# Patient Record
Sex: FEMALE | Race: BLACK OR AFRICAN AMERICAN | NOT HISPANIC OR LATINO | URBAN - METROPOLITAN AREA
[De-identification: names, ages, dates, MRNs, and addresses within clinical notes are randomized per-mention and may not be internally consistent; named-entity substitution may affect disease eponyms.]

---

## 2024-09-20 ENCOUNTER — EMERGENCY (EMERGENCY)
Facility: HOSPITAL | Age: 29
LOS: 1 days | End: 2024-09-20
Attending: EMERGENCY MEDICINE
Payer: MEDICAID

## 2024-09-20 VITALS
RESPIRATION RATE: 18 BRPM | OXYGEN SATURATION: 100 % | SYSTOLIC BLOOD PRESSURE: 138 MMHG | TEMPERATURE: 100 F | DIASTOLIC BLOOD PRESSURE: 98 MMHG | HEART RATE: 99 BPM

## 2024-09-20 PROCEDURE — 99283 EMERGENCY DEPT VISIT LOW MDM: CPT

## 2024-09-20 PROCEDURE — 99284 EMERGENCY DEPT VISIT MOD MDM: CPT

## 2024-09-20 RX ORDER — BACITRACIN 500 UNIT/G
1 OINTMENT (GRAM) TOPICAL
Qty: 1 | Refills: 0
Start: 2024-09-20 | End: 2024-09-26

## 2024-09-20 RX ORDER — AMOXICILLIN AND CLAVULANATE POTASSIUM 250; 125 MG/1; MG/1
1 TABLET, FILM COATED ORAL ONCE
Refills: 0 | Status: COMPLETED | OUTPATIENT
Start: 2024-09-20 | End: 2024-09-20

## 2024-09-20 RX ORDER — AMOXICILLIN AND CLAVULANATE POTASSIUM 250; 125 MG/1; MG/1
875 TABLET, FILM COATED ORAL
Qty: 20 | Refills: 0
Start: 2024-09-20 | End: 2024-09-29

## 2024-09-20 RX ADMIN — AMOXICILLIN AND CLAVULANATE POTASSIUM 1 TABLET(S): 250; 125 TABLET, FILM COATED ORAL at 19:46

## 2024-09-20 NOTE — ED PROVIDER NOTE - PATIENT PORTAL LINK FT
You can access the FollowMyHealth Patient Portal offered by [facility] by registering at the following website: [email or web address]. By joining [employer]’s FollowMyHealth portal, you will also be able to view your health information using other applications (apps) compatible with our system.

## 2024-09-20 NOTE — ED ADULT NURSE NOTE - NSFALLUNIVINTERV_ED_ALL_ED
Bed/Stretcher in lowest position, wheels locked, appropriate side rails in place/Call bell, personal items and telephone in reach/Instruct patient to call for assistance before getting out of bed/chair/stretcher/Non-slip footwear applied when patient is off stretcher/[place] to call system/Physically safe environment - no spills, clutter or unnecessary equipment/Purposeful proactive rounding/Room/bathroom lighting operational, light cord in reach

## 2024-09-20 NOTE — ED PROVIDER NOTE - CLINICAL SUMMARY MEDICAL DECISION MAKING FREE TEXT BOX
29yoF p/w right breat pain with nipple discharge x1 day.  denies cp/sob. denies f/c/s. denies n/v.  denies trauma. does report hx of nipple ring and mastitis in 2018.  DDx includes neoplasm vs early mastitis or cyst. will place on abx, but advised to follow up with PMD and breast surgeon for further imaging.

## 2024-09-20 NOTE — ED PROVIDER NOTE - NS ED ROS FT
Constitutional: (-) fever  (-)chills  (-)sweats  Eyes/ENT: (-)   Cardiovascular: (-) chest pain, (-) palpitations (-) edema   Respiratory: (-) cough, (-) shortness of breath   Gastrointestinal: (-)nausea  (-)vomiting, (-) diarrhea  (-) abdominal pain   :  (-)dysuria, (-)frequency, (-)urgency, (-)hematuria  Musculoskeletal: (-) neck pain, (-) back pain, (-) joint pain  Integumentary: +breast pain with discharge  Neurological: (-) headache, (-) altered mental status  (-)LOC

## 2024-09-20 NOTE — ED PROVIDER NOTE - PHYSICAL EXAMINATION
General:     NAD  Head:     NC/AT, EOMI,   Neck:     trachea midline  Lungs:     CTA b/l, no w/r/r  CVS:     S1S2, RRR, no m/g/r  BREAST EXAM (chaperoned by ROCIO Robert): nt over left breast.  ttp over 3'oclock breast, mild erythema over nipple with white discharge.  warm to touch.

## 2024-09-20 NOTE — ED PROVIDER NOTE - CARE PROVIDER_API CALL
Nneka Connell  Surgery  [address] [place] [address]  Phone: [phone]  Fax: [phone]  Follow Up Time: 1-3 Days

## 2024-09-20 NOTE — ED ADULT NURSE NOTE - OBJECTIVE STATEMENT
Patient states that she has discharge from her nipple that started one day ago. denies fevers or chills

## 2024-09-20 NOTE — ED PROVIDER NOTE - NSFOLLOWUPINSTRUCTIONS_ED_ALL_ED_FT
You are advised to please follow up with your primary care doctor within the next 24 hours and return to the Emergency Department for worsening symptoms or any other concerns.  Your doctor may call [phone] to follow up on the specific results of the tests performed today in the emergency department.    Nipple Discharge    WHAT YOU NEED TO KNOW:    What do I need to know about nipple discharge? Nipple discharge is fluid from one or both nipples. Fluid may come out on its own or when you touch your breast or nipple. The fluid may be white, yellow, green, pink, watery, or bloody. Nipple discharge is normal in a woman that is pregnant or breastfeeding. A woman should contact her healthcare provider if she has nipple discharge when she is not pregnant or breastfeeding. A man should always contact his healthcare provider if he has nipple discharge.    What signs and symptoms may happen with nipple discharge?    Fever or body aches    Breast pain or discomfort    A swollen breast or nipple    A change in breast shape    A change in nipple shape or color    Swollen lymph nodes under your arms  What causes nipple discharge?    A growth or abnormal cells in the milk ducts    Too much of the hormone that causes your breasts to make milk    Breast cancer    Infection or injury to the chest    Touching the breast or nipple during sex    Rubbing of clothing against the nipple during vigorous exercise    Certain medicines such as oral contraceptives, antidepressants, or blood pressure medicines  How is nipple discharge diagnosed? Tell your healthcare provider about your symptoms. He or she may feel your breast for lumps or swelling. Tell him or her about any medicines that you take. You may need any of the following tests to find the cause of your nipple discharge:    Blood tests may be done to check your hormone levels or for infection or pregnancy.    Mammogram, ultrasound, or MRI pictures may show a tumor or infection in your breast.        A ductoscopy uses a scope to look inside your milk ducts for tumors or infection.    A breast biopsy may be done to test for cancer or infection. Breast tissue is removed and sent to the lab for tests.  How is nipple discharge treated? Treatment will depend on the cause of your nipple discharge. Medicines that cause nipple discharge may be stopped or changed. Medicines may be given to control your hormone levels, decrease pain, or treat an infection. Avoid touching your nipples or breast. This may stop or decrease discharge from your nipples. Wear a tight fitting bra during exercise to decrease rubbing on your nipples.    When should I contact my healthcare provider?    You have a fever.    You have more breast discharge or it changes color.    You have new or worsening breast pain.    You have questions or concerns about your condition or care.

## 2024-09-20 NOTE — ED PROVIDER NOTE - OBJECTIVE STATEMENT
29yoF p/w right breat pain with nipple discharge x1 day.  denies cp/sob. denies f/c/s. denies n/v.  denies trauma. does report hx of nipple ring and mastitis in 2018.

## 2024-09-20 NOTE — ED PROVIDER NOTE - CARE PROVIDERS DIRECT ADDRESSES
[email or web address]